# Patient Record
Sex: FEMALE | Race: WHITE | Employment: UNEMPLOYED | URBAN - NONMETROPOLITAN AREA
[De-identification: names, ages, dates, MRNs, and addresses within clinical notes are randomized per-mention and may not be internally consistent; named-entity substitution may affect disease eponyms.]

---

## 2018-05-11 ENCOUNTER — HOSPITAL ENCOUNTER (OUTPATIENT)
Dept: NON INVASIVE DIAGNOSTICS | Age: 58
Discharge: HOME | End: 2018-05-11
Payer: COMMERCIAL

## 2018-05-11 LAB
LV EF: 60 %
LVEF MODALITY: NORMAL

## 2018-05-11 PROCEDURE — 93306 TTE W/DOPPLER COMPLETE: CPT

## 2018-05-21 ENCOUNTER — HOSPITAL ENCOUNTER (OUTPATIENT)
Dept: MAMMOGRAPHY | Age: 58
Discharge: HOME | End: 2018-05-21
Payer: COMMERCIAL

## 2018-05-21 DIAGNOSIS — Z12.39 BREAST SCREENING: ICD-10-CM

## 2018-05-21 PROCEDURE — 77063 BREAST TOMOSYNTHESIS BI: CPT

## 2019-06-18 ENCOUNTER — HOSPITAL ENCOUNTER (OUTPATIENT)
Dept: MAMMOGRAPHY | Age: 59
Discharge: HOME | End: 2019-06-18
Payer: COMMERCIAL

## 2019-06-18 DIAGNOSIS — Z12.39 SCREENING BREAST EXAMINATION: ICD-10-CM

## 2019-06-18 PROCEDURE — 77063 BREAST TOMOSYNTHESIS BI: CPT

## 2019-06-18 PROCEDURE — 77067 SCR MAMMO BI INCL CAD: CPT

## 2020-07-16 ENCOUNTER — HOSPITAL ENCOUNTER (OUTPATIENT)
Dept: MAMMOGRAPHY | Age: 60
Discharge: HOME | End: 2020-07-16
Payer: COMMERCIAL

## 2020-07-16 PROCEDURE — 77063 BREAST TOMOSYNTHESIS BI: CPT

## 2020-08-25 ENCOUNTER — TELEPHONE (OUTPATIENT)
Dept: CASE MANAGEMENT | Age: 60
End: 2020-08-25

## 2020-08-25 NOTE — TELEPHONE ENCOUNTER
Name: Clemente Harris  : 1960  MRN: [id number]    Oncology Navigation Follow-Up Note    Contact Type:  Telephone    Notes:  Called patient re: request for genetic evaluation, indicated on breast cancer risk assessment 2020 at [facility] during mammogram visit. Asking many questions related to cost/insurance coverage/need to do testing based on her family history. Teaching provided on NCCN guidelines and insurance information/cost. Verbalizes understanding but still unsure if wants to proceed. Would like call from genetic counselor to decide. Request forwarded to [facility]. Will follow and assist with obtaining referral if needed.     Electronically signed by Prabhu Peres RN on 2020 at 12:42 PM

## 2020-08-26 ENCOUNTER — TELEPHONE (OUTPATIENT)
Dept: ONCOLOGY | Age: 60
End: 2020-08-26

## 2020-08-26 NOTE — TELEPHONE ENCOUNTER
Notified by Kevon Lesch, oncology nurse navigator, that patient qualified for genetic evaluation but is requesting phone call to discuss further before making decision to proceed. Contacted patient and left voicemail encouraging her to call back directly at her earliest convenience to discuss her questions.

## 2020-09-17 ENCOUNTER — TELEPHONE (OUTPATIENT)
Dept: CASE MANAGEMENT | Age: 60
End: 2020-09-17

## 2020-09-17 NOTE — TELEPHONE ENCOUNTER
Name: Sushma Sandoval  : 1960  MRN: [id number]    Oncology Navigation Follow-Up Note    Contact Type:  Telephone    Notes: Phani Patricio re: outcome of patient's genetic request and wanting to speak with genetic counselor. States she did leave message 2020 with contact number and has not heard from patient. I called patient again today, states \"I have not had a chance to call her yet. \" Encouraged patient to do and to let me know her decision. Verbalizes understanding of my contact number and states she \"will let me know soon. \" I plan to assist with obtaining referral from PCP if decides to proceed with process.      Electronically signed by Batsheva Rodriguez RN on 2020 at 2:38 PM

## 2020-12-16 ENCOUNTER — HOSPITAL ENCOUNTER (EMERGENCY)
Age: 60
Discharge: HOME | End: 2020-12-17
Attending: FAMILY MEDICINE
Payer: COMMERCIAL

## 2020-12-16 ENCOUNTER — APPOINTMENT (OUTPATIENT)
Dept: GENERAL RADIOLOGY | Age: 60
End: 2020-12-16
Payer: COMMERCIAL

## 2020-12-16 LAB
ALBUMIN SERPL-MCNC: 3.8 GM/DL (ref 3.4–5)
ALP BLD-CCNC: 96 U/L (ref 46–116)
ALT SERPL-CCNC: 30 U/L (ref 14–63)
AMYLASE: 38 U/L (ref 25–115)
ANION GAP: 10 MEQ/L (ref 8–16)
AST SERPL-CCNC: 21 U/L (ref 15–37)
BASOPHILS # BLD: 0.6 % (ref 0–3)
BILIRUB SERPL-MCNC: 0.5 MG/DL (ref 0.2–1)
BILIRUBIN DIRECT: 0.12 MG/DL (ref 0–0.2)
BUN BLDV-MCNC: 17 MG/DL (ref 7–18)
CHLORIDE BLD-SCNC: 106 MEQ/L (ref 98–107)
CO2: 28 MEQ/L (ref 21–32)
CREAT SERPL-MCNC: 1 MG/DL (ref 0.6–1.3)
EKG ATRIAL RATE: 72 BPM
EKG P AXIS: 54 DEGREES
EKG P-R INTERVAL: 150 MS
EKG Q-T INTERVAL: 374 MS
EKG QRS DURATION: 88 MS
EKG QTC CALCULATION (BAZETT): 409 MS
EKG R AXIS: 34 DEGREES
EKG T AXIS: -37 DEGREES
EKG VENTRICULAR RATE: 72 BPM
EOSINOPHILS RELATIVE PERCENT: 3 % (ref 0–4)
GFR, ESTIMATED: 60 ML/MIN/1.73M2
GLUCOSE BLD-MCNC: 115 MG/DL (ref 74–106)
HCT VFR BLD CALC: 43.3 % (ref 37–47)
HEMOGLOBIN: 14.5 GM/DL (ref 12–16)
LIPASE: 101 U/L (ref 73–393)
LYMPHOCYTES # BLD: 33.5 % (ref 15–47)
MCH RBC QN AUTO: 30.1 PG (ref 27–31)
MCHC RBC AUTO-ENTMCNC: 33.5 GM/DL (ref 33–37)
MCV RBC AUTO: 89.9 FL (ref 81–99)
MONOCYTES: 8.2 % (ref 0–12)
PDW BLD-RTO: 12.2 % (ref 11.5–14.5)
PLATELET # BLD: 311 THOU/MM3 (ref 130–400)
PMV BLD AUTO: 6.5 FL (ref 7.4–10.4)
POC CALCIUM: 9.9 MG/DL (ref 8.5–10.1)
POTASSIUM SERPL-SCNC: 3.6 MEQ/L (ref 3.5–5.1)
RBC # BLD: 4.81 MILL/MM3 (ref 4.2–5.4)
SEGS: 54.7 % (ref 43–75)
SODIUM BLD-SCNC: 144 MEQ/L (ref 136–145)
TOTAL PROTEIN: 7.5 GM/DL (ref 6.4–8.2)
TROPONIN I: < 0.017 NG/ML (ref 0.02–0.06)
WBC # BLD: 8.3 THOU/MM3 (ref 4.8–10.8)

## 2020-12-16 PROCEDURE — 80053 COMPREHEN METABOLIC PANEL: CPT

## 2020-12-16 PROCEDURE — 85025 COMPLETE CBC W/AUTO DIFF WBC: CPT

## 2020-12-16 PROCEDURE — 99284 EMERGENCY DEPT VISIT MOD MDM: CPT

## 2020-12-16 PROCEDURE — 36415 COLL VENOUS BLD VENIPUNCTURE: CPT

## 2020-12-16 PROCEDURE — 2709999900 HC NON-CHARGEABLE SUPPLY

## 2020-12-16 PROCEDURE — 83690 ASSAY OF LIPASE: CPT

## 2020-12-16 PROCEDURE — 82150 ASSAY OF AMYLASE: CPT

## 2020-12-16 PROCEDURE — 84484 ASSAY OF TROPONIN QUANT: CPT

## 2020-12-16 PROCEDURE — 71046 X-RAY EXAM CHEST 2 VIEWS: CPT

## 2020-12-16 PROCEDURE — 82248 BILIRUBIN DIRECT: CPT

## 2020-12-16 PROCEDURE — 93005 ELECTROCARDIOGRAM TRACING: CPT | Performed by: FAMILY MEDICINE

## 2020-12-17 VITALS
HEIGHT: 64 IN | WEIGHT: 195 LBS | SYSTOLIC BLOOD PRESSURE: 118 MMHG | HEART RATE: 77 BPM | BODY MASS INDEX: 33.29 KG/M2 | TEMPERATURE: 97.4 F | RESPIRATION RATE: 13 BRPM | OXYGEN SATURATION: 96 % | DIASTOLIC BLOOD PRESSURE: 60 MMHG

## 2020-12-17 PROCEDURE — 93010 ELECTROCARDIOGRAM REPORT: CPT | Performed by: INTERNAL MEDICINE

## 2020-12-17 ASSESSMENT — ENCOUNTER SYMPTOMS
ABDOMINAL PAIN: 0
CHEST TIGHTNESS: 0
SINUS PRESSURE: 0
NAUSEA: 0
WHEEZING: 0
DIARRHEA: 0
COLOR CHANGE: 0
BACK PAIN: 0
VOMITING: 0
SHORTNESS OF BREATH: 0
SORE THROAT: 0

## 2020-12-17 NOTE — ED TRIAGE NOTES
Pt presents front window with spouse at side. C/o palpitations without cp that began around lunch time. Pt expressed she rested and it felt somewhat better but did not resolve. She \"listened to her heart\" and heard irregularities and wondered if she was in \"afib\". Pt sees Dr Stefani Benedict for cardiology> History of palpitations. Pt denies sob until walking to the room but is admittedly anxious as well. Pt triaged exam room 7. Alert and oriented. Skin warm and dry. Calm and cooperative. Spouse to room at bedside.

## 2020-12-17 NOTE — ED NOTES
Pt returned to ED from xray. Updated on wait times. Call light in reach.       Nydia Trivedi, RN  12/16/20 [id number]

## 2020-12-17 NOTE — ED PROVIDER NOTES
[facility]          CHIEF COMPLAINT       Chief Complaint   Patient presents with    Palpitations       Nurses Notes reviewed and I agree except as noted in the HPI. HISTORY OF PRESENT ILLNESS    Mary Cárdenas is a 63 y.o. female who presents for evaluation of palpitations. She does not have any chest pain but has noted palpitations throughout the day. She states that she has experienced palpitations in the past but they have been persistent since around lunchtime. She states that she listened to her heart and could hear the irregularities in addition to feel them when they were occurring. Patient follows with Dr. Arlene Mills. REVIEW OF SYSTEMS     Review of Systems   Constitutional: Negative for appetite change, chills, fatigue and fever. HENT: Negative for sinus pressure and sore throat. Respiratory: Negative for chest tightness, shortness of breath and wheezing. Cardiovascular: Positive for palpitations. Negative for chest pain and leg swelling. Gastrointestinal: Negative for abdominal pain, diarrhea, nausea and vomiting. Genitourinary: Negative for dysuria, flank pain and frequency. Musculoskeletal: Negative for back pain, gait problem, joint swelling and neck stiffness. Skin: Negative for color change and rash. Neurological: Negative for dizziness, light-headedness and headaches. Psychiatric/Behavioral: Negative for agitation and hallucinations. The patient is nervous/anxious. PAST MEDICAL HISTORY    has a past medical history of GERD (gastroesophageal reflux disease) and Hyperlipidemia. SURGICAL HISTORY      has no past surgical history on file.     CURRENT MEDICATIONS       Discharge Medication List as of 12/16/2020 11:50 PM      CONTINUE these medications which have NOT CHANGED    Details nitroGLYCERIN (NITROSTAT) 0.4 MG SL tablet Place 1 tablet under the tongue every 5 minutes as needed for Chest pain, Disp-25 tablet, R-1      Fish Oil-Cholecalciferol (FISH OIL + D3 PO) Take  by mouth. Glucosamine-Chondroit-Vit C-Mn (GLUCOSAMINE CHONDR 500 COMPLEX PO) Take  by mouth. CINNAMON PO Take  by mouth. therapeutic multivitamin-minerals (THERAGRAN-M) tablet Take 1 tablet by mouth daily. metoprolol (TOPROL-XL) 25 MG XL tablet Take 0.5 tablets by mouth daily. , Disp-45 tablet, R-3      ropinirole (REQUIP) 0.5 MG tablet Take 1 mg by mouth 3 times daily. simvastatin (ZOCOR) 20 MG tablet Take 20 mg by mouth nightly. loratadine (CLARITIN) 10 MG tablet Take 10 mg by mouth daily. aspirin 81 MG EC tablet Take 81 mg by mouth daily. omeprazole (PRILOSEC) 20 MG capsule Take 20 mg by mouth daily. ALLERGIES     is allergic to avalide [irbesartan-hydrochlorothiazide] and gabapentin. FAMILY HISTORY     She indicated that her mother is alive. She indicated that her father is alive. She indicated that her brother is alive. She indicated that her maternal grandfather is . She indicated that her paternal grandfather is . family history includes Cancer in her father; Diabetes in her brother and father; Heart Disease in her father, maternal grandfather, and paternal grandfather; High Blood Pressure in her father; Stroke in her maternal grandfather and paternal grandfather. SOCIAL HISTORY      reports that she has never smoked. She does not have any smokeless tobacco history on file. She reports that she does not drink alcohol or use drugs. PHYSICAL EXAM     INITIAL VITALS:  height is 5' 4\" (1.626 m) and weight is 195 lb (88.5 kg). Her temporal temperature is 97.4 °F (36.3 °C). Her blood pressure is 118/60 and her pulse is 77. Her respiration is 13 and oxygen saturation is 96%. Physical Exam  Vitals signs and nursing note reviewed. All other components within normal limits   TROPONIN   HEPATIC FUNCTION PANEL   LIPASE   AMYLASE   ANION GAP       DEPARTMENT COURSE:   Vitals:    Vitals:    12/16/20 2318 12/16/20 2332 12/16/20 2346 12/17/20 0002   BP: 132/80 (!) 143/86 116/68 118/60   Pulse: 70 79 72 77   Resp: 14 12 14 13   Temp:       TempSrc:       SpO2: 97% 96% 94% 96%   Weight:       Height:           MDM:  Patient presents for evaluation of her palpitations. EKG demonstrates PACs. Electrolytes were within normal limits. Patient is not anemic and troponin not detected. Patient was discharged home in stable condition with recommendation to follow-up with Dr. Zeus Amaya return to the department for symptom worsening or for evaluation of new concerning symptoms. CRITICAL CARE:   None    CONSULTS:  None    PROCEDURES:  None    FINAL IMPRESSION      1.  Palpitations          DISPOSITION/PLAN   Discharge    PATIENT REFERRED TO:  Olman Howell, [facility]  [address]  [phone]    Schedule an appointment as soon as possible for a visit in 2 days  As needed      DISCHARGEMEDICATIONS:  Discharge Medication List as of 12/16/2020 11:50 PM          (Please note that portions of this note were completedwith a voice recognition program.  Efforts were made to edit the dictations but occasionally words are mis-transcribed.)    MD Jose Brooks MD  12/17/20 7366

## 2020-12-17 NOTE — ED NOTES
Discharge teaching and instructions for condition explained to patient. AVS reviewed given to patient. Patient voiced understanding regarding prescriptions, follow up appointments and care of self at home.  Pt discharged to home in stable condition with spouse       Phi Caldwell RN  12/17/20 0010       Phi Caldwell RN  12/17/20 0010

## 2021-07-20 ENCOUNTER — OFFICE VISIT (OUTPATIENT)
Dept: SURGERY | Age: 61
End: 2021-07-20
Payer: COMMERCIAL

## 2021-07-20 VITALS
HEART RATE: 76 BPM | RESPIRATION RATE: 14 BRPM | HEIGHT: 64 IN | OXYGEN SATURATION: 95 % | SYSTOLIC BLOOD PRESSURE: 132 MMHG | WEIGHT: 195 LBS | TEMPERATURE: 97.6 F | BODY MASS INDEX: 33.29 KG/M2 | DIASTOLIC BLOOD PRESSURE: 72 MMHG

## 2021-07-20 DIAGNOSIS — Z12.11 ENCOUNTER FOR COLORECTAL CANCER SCREENING: Primary | ICD-10-CM

## 2021-07-20 DIAGNOSIS — K21.9 GASTROESOPHAGEAL REFLUX DISEASE, UNSPECIFIED WHETHER ESOPHAGITIS PRESENT: ICD-10-CM

## 2021-07-20 DIAGNOSIS — Z12.12 ENCOUNTER FOR COLORECTAL CANCER SCREENING: Primary | ICD-10-CM

## 2021-07-20 PROCEDURE — 99213 OFFICE O/P EST LOW 20 MIN: CPT | Performed by: SURGERY

## 2021-07-20 RX ORDER — MULTIVIT WITH MINERALS/LUTEIN
250 TABLET ORAL DAILY
COMMUNITY

## 2021-07-20 RX ORDER — DOCUSATE SODIUM 100 MG/1
100 CAPSULE, LIQUID FILLED ORAL 2 TIMES DAILY
COMMUNITY

## 2021-07-20 RX ORDER — MULTIVIT-MIN/IRON/FOLIC ACID/K 18-600-40
CAPSULE ORAL
COMMUNITY

## 2021-07-20 RX ORDER — LANOLIN ALCOHOL/MO/W.PET/CERES
5 CREAM (GRAM) TOPICAL DAILY
COMMUNITY

## 2021-07-20 ASSESSMENT — ENCOUNTER SYMPTOMS
COUGH: 0
SINUS PRESSURE: 0
CHEST TIGHTNESS: 0
CONSTIPATION: 0
APNEA: 0
NAUSEA: 0
PHOTOPHOBIA: 0
EYE REDNESS: 0
COLOR CHANGE: 0
SORE THROAT: 0
ABDOMINAL PAIN: 0
TROUBLE SWALLOWING: 0
DIARRHEA: 0
RECTAL PAIN: 0
RHINORRHEA: 0
SINUS PAIN: 0
VOMITING: 0
VOICE CHANGE: 0
EYE PAIN: 0
WHEEZING: 0
ABDOMINAL DISTENTION: 0
BACK PAIN: 0
EYE ITCHING: 0
BLOOD IN STOOL: 0
STRIDOR: 0
EYE DISCHARGE: 0
ANAL BLEEDING: 0
FACIAL SWELLING: 0
CHOKING: 0
SHORTNESS OF BREATH: 0

## 2021-07-20 NOTE — LETTER
Chiara Castro   [address] [place] [address]  [phone]     Pt Name: Josey Azevedo  Medical Record Number: [id number]  Date of Birth 1960   Today's Date: 7/20/2021    Jarocho Bueno was seen in consultation in the office today. My assessment and plans are listed below. ASSESSMENT     Diagnosis Orders   1. Encounter for colorectal cancer screening  COLONOSCOPY W/ OR W/O BIOPSY   2. Gastroesophageal reflux disease, unspecified whether esophagitis present         Past Medical History        Past Medical History:   Diagnosis Date    GERD (gastroesophageal reflux disease)      Hyperlipidemia            PLANS   1. Schedule Alexus for colonoscopy with possible biopsy/polypectomy and EGD  2. Potential diagnostic/therapeutic options and alternatives were discussed with the patient in the office. Potential risks of bleeding, infection, perforation and missed lesions was reviewed. Potential for biopsy and/or polypectomy was discussed. We also discussed the use of IV sedation and colon preparation instructions. The patient was given an opportunity to ask questions. Once answered, the patient was agreeable to proceed with the procedure. 3. Status: Outpatient in endoscopy  4. Planned anesthesia: IV sedation provided by myself  5. Perioperative bowel preparation with Miralax and Dulcolax. Instructions given and questions answered. If I can provide any additional assistance or you have any concerns, please feel free to contact me. Thank you for allowing to participate in the care of your patients. Sincerely,      Janny Encinas.  Amandeep Arthur

## 2021-07-20 NOTE — PROGRESS NOTES
Subjective:      Patient ID: Donn Ruiz is a 57 y.o. female. Chief Complaint   Patient presents with   [facility] Consult     established pt--due for colonoscopy screening-last one 2011       HPI  /72 (Site: Right Upper Arm, Position: Sitting, Cuff Size: Medium Adult)   Pulse 76   Temp 97.6 °F (36.4 °C) (Tympanic)   Resp 14   Ht 5' 4\" (1.626 m)   Wt 195 lb (88.5 kg)   SpO2 95%   BMI 33.47 kg/m²     Review of Systems   Constitutional: Negative for activity change, appetite change, chills, diaphoresis, fatigue, fever and unexpected weight change. HENT: Negative for congestion, dental problem, drooling, ear discharge, ear pain, facial swelling, hearing loss, mouth sores, nosebleeds, postnasal drip, rhinorrhea, sinus pressure, sinus pain, sneezing, sore throat, tinnitus, trouble swallowing and voice change. Eyes: Negative for photophobia, pain, discharge, redness, itching and visual disturbance. Respiratory: Negative for apnea, cough, choking, chest tightness, shortness of breath, wheezing and stridor. Cardiovascular: Negative for chest pain, palpitations and leg swelling. Gastrointestinal: Negative for abdominal distention, abdominal pain, anal bleeding, blood in stool, constipation, diarrhea, nausea, rectal pain and vomiting. Endocrine: Negative for cold intolerance, heat intolerance, polydipsia, polyphagia and polyuria. Genitourinary: Negative for decreased urine volume, difficulty urinating, dyspareunia, dysuria, enuresis, flank pain, frequency, genital sores, hematuria, menstrual problem, pelvic pain, urgency, vaginal bleeding, vaginal discharge and vaginal pain. Musculoskeletal: Negative for arthralgias, back pain, gait problem, joint swelling, myalgias, neck pain and neck stiffness. Skin: Negative for color change, pallor, rash and wound. Allergic/Immunologic: Negative for environmental allergies, food allergies and immunocompromised state.    Neurological: Negative for dizziness, tremors, seizures, syncope, facial asymmetry, speech difficulty, weakness, light-headedness, numbness and headaches. Hematological: Negative for adenopathy. Does not bruise/bleed easily. Psychiatric/Behavioral: Negative for agitation, behavioral problems, confusion, decreased concentration, dysphoric mood, hallucinations, self-injury, sleep disturbance and suicidal ideas. The patient is not nervous/anxious and is not hyperactive.         Objective:   Physical Exam    Assessment:            Plan:              Omsany Apodaca

## 2021-07-20 NOTE — PROGRESS NOTES
[facility] [address]  [phone]  New Patient Evaluation in Office    Pt Name: Bhavna Madrigal  Date of Birth 1960   Today's Date: 7/20/2021  Medical Record Number: [id number]  Referring Provider: No ref. provider found  Primary Care Provider: Gaviota Esquivel MD  Chief Complaint   Patient presents with   [facility] Consult     established pt--due for colonoscopy screening-last one 2011     ASSESSMENT       Diagnosis Orders   1. Encounter for colorectal cancer screening  COLONOSCOPY W/ OR W/O BIOPSY   2. Gastroesophageal reflux disease, unspecified whether esophagitis present       Past Medical History:   Diagnosis Date    GERD (gastroesophageal reflux disease)     Hyperlipidemia           PLANS      1. Schedule Alexus for colonoscopy with possible biopsy/polypectomy and EGD  2. Potential diagnostic/therapeutic options and alternatives were discussed with the patient in the office. Potential risks of bleeding, infection, perforation and missed lesions was reviewed. Potential for biopsy and/or polypectomy was discussed. We also discussed the use of IV sedation and colon preparation instructions. The patient was given an opportunity to ask questions. Once answered, the patient was agreeable to proceed with the procedure. 3. Status: Outpatient in endoscopy  4. Planned anesthesia: IV sedation provided by myself  5. Perioperative bowel preparation with Miralax and Dulcolax. Instructions given and questions answered. Michael Awad is a 57 y.o. female seen in the consultation for evaluation regarding colonoscopy. She is over the age of 47 and last colonoscopy completed 2011 without polyps.  She denies any history of previous adenomatous polyp, previous colon cancer, family history of colon cancer, rectal bleeding, melena, iron deficiency anemia, chronic abdominal pain, change in bowel habits, unexplained weight loss. She does report worsening reflux symptoms, which will wake her up at night. This is worse with spicy foods and tomato sauces, and worse when eating late at night. She takes omeprazole daily, but this does not always keep symptoms controlled. She has not tried additional antacids. Last EGD done 10 years ago without evidence of Kwon's esophagus    Past Medical History  Past Medical History:   Diagnosis Date    GERD (gastroesophageal reflux disease)     Hyperlipidemia      Past Surgical History  Past Surgical History:   Procedure Laterality Date    COLONOSCOPY  12/05/2011    Dr. Emily Euceda  12/05/2011    Dr. Mague Rojas EXTRACTION       Medications  Current Outpatient Medications   Medication Sig Dispense Refill    melatonin 3 MG TABS tablet Take 5 mg by mouth daily      docusate sodium (COLACE) 100 MG capsule Take 100 mg by mouth 2 times daily      Ascorbic Acid (VITAMIN C) 250 MG tablet Take 250 mg by mouth daily      Cholecalciferol (VITAMIN D) 50 MCG (2000 UT) CAPS capsule Take by mouth      Zinc Sulfate (ZINC-220 PO) Take by mouth      Fish Oil-Cholecalciferol (FISH OIL + D3 PO) Take  by mouth.  Glucosamine-Chondroit-Vit C-Mn (GLUCOSAMINE CHONDR 500 COMPLEX PO) Take  by mouth.  CINNAMON PO Take  by mouth.  therapeutic multivitamin-minerals (THERAGRAN-M) tablet Take 1 tablet by mouth daily.  metoprolol (TOPROL-XL) 25 MG XL tablet Take 0.5 tablets by mouth daily. 45 tablet 3    ropinirole (REQUIP) 0.5 MG tablet Take 1 mg by mouth 3 times daily.  simvastatin (ZOCOR) 20 MG tablet Take 20 mg by mouth nightly.  aspirin 81 MG EC tablet Take 81 mg by mouth daily.  omeprazole (PRILOSEC) 20 MG capsule Take 20 mg by mouth daily.         nitroGLYCERIN (NITROSTAT) 0.4 MG SL tablet Place 1 tablet under the tongue every 5 minutes as needed for Chest pain (Patient not taking: Reported on 7/20/2021) 25 tablet 1    loratadine (CLARITIN) 10 MG tablet Take 10 mg by mouth daily. (Patient not taking: Reported on 7/20/2021)       No current facility-administered medications for this visit. Allergies  is allergic to avalide [irbesartan-hydrochlorothiazide] and gabapentin. Family History  family history includes Cancer in her father; Diabetes in her brother and father; Heart Disease in her father, maternal grandfather, and paternal grandfather; High Blood Pressure in her father; Stroke in her maternal grandfather and paternal grandfather. Social History   reports that she has never smoked. She has never used smokeless tobacco. She reports that she does not drink alcohol and does not use drugs. Health Screening Exams  Health Maintenance   Topic Date Due    Hepatitis C screen  Never done    COVID-19 Vaccine (1) Never done    HIV screen  Never done    DTaP/Tdap/Td vaccine (1 - Tdap) Never done    Cervical cancer screen  Never done    Diabetes screen  Never done    Colon cancer screen colonoscopy  Never done    Shingles Vaccine (1 of 2) Never done    Lipid screen  07/27/2017    Flu vaccine (1) 09/01/2021    Breast cancer screen  07/16/2022    Hepatitis A vaccine  Aged Out    Hepatitis B vaccine  Aged Out    Hib vaccine  Aged Out    Meningococcal (ACWY) vaccine  Aged Out    Pneumococcal 0-64 years Vaccine  Aged Out     Review of Systems  History obtained from the patient. Constitutional: Denies any fever or chills. Resp: Denies any shortness of breath of cough. CV: Denies any chest pain or palpitations. GI: Positive heartburn and reflux. Denies any nausea, vomiting, diarrhea, constipation, abdominal pain or blood in the stool. OBJECTIVE    VITALS:  height is 5' 4\" (1.626 m) and weight is 195 lb (88.5 kg). Her tympanic temperature is 97.6 °F (36.4 °C). Her blood pressure is 132/72 and her pulse is 76. Her respiration is 14 and oxygen saturation is 95%. Body mass index is 33.47 kg/m².   CONSTITUTIONAL: Alert and oriented times 3, no acute distress and cooperative to examination with proper mood and affect. SKIN: Skin color, texture, turgor normal. No rashes or lesions. LYMPH: no cervical nodes, no inguinal nodes  HEENT: Head is normocephalic, atraumatic. EOMI, PERRLA. NECK: Supple, symmetrical, trachea midline, no adenopathy, thyroid symmetric, not enlarged and no tenderness, skin normal.  CHEST/LUNGS: chest symmetric with normal A/P diameter, normal respiratory rate and rhythm, lungs clear to auscultation without wheezes, rales or rhonchi. No accessory muscle use. Scars None   CARDIOVASCULAR: Heart sounds are normal.  Regular rate and rhythm without murmur, gallop or rub. Normal S1 and S2. Carotid and femoral pulses 2+/4 and equal bilaterally. ABDOMEN: Normal shape. No scar(s) present. Normal bowel sounds. No bruits. soft, nontender, nondistended, no masses or organomegaly. no evidence of hernia. Percussion: Normal without hepatosplenomegally. RECTAL: deferred, not clinically indicated  NEUROLOGIC: There are no focalizing motor or sensory deficits. CN II-XII are grossly intact. Benedetta Ou EXTREMITIES: no cyanosis, no clubbing and no edema. Thank you for the interesting evaluation. Further recommendations as listed above.        Electronically signed by Colette Rinne, MD on 7/20/2021 at 1:55 PM

## 2021-08-09 ENCOUNTER — HOSPITAL ENCOUNTER (OUTPATIENT)
Age: 61
Setting detail: OUTPATIENT SURGERY
Discharge: HOME | End: 2021-08-09
Attending: SURGERY | Admitting: SURGERY
Payer: COMMERCIAL

## 2021-08-09 VITALS
TEMPERATURE: 97.6 F | WEIGHT: 199.4 LBS | RESPIRATION RATE: 18 BRPM | DIASTOLIC BLOOD PRESSURE: 61 MMHG | BODY MASS INDEX: 34.04 KG/M2 | OXYGEN SATURATION: 95 % | HEIGHT: 64 IN | HEART RATE: 63 BPM | SYSTOLIC BLOOD PRESSURE: 110 MMHG

## 2021-08-09 PROCEDURE — 2580000003 HC RX 258: Performed by: SURGERY

## 2021-08-09 PROCEDURE — 7100000011 HC PHASE II RECOVERY - ADDTL 15 MIN: Performed by: SURGERY

## 2021-08-09 PROCEDURE — 88305 TISSUE EXAM BY PATHOLOGIST: CPT

## 2021-08-09 PROCEDURE — 45380 COLONOSCOPY AND BIOPSY: CPT | Performed by: SURGERY

## 2021-08-09 PROCEDURE — 3609010300 HC COLONOSCOPY W/BIOPSY SINGLE/MULTIPLE: Performed by: SURGERY

## 2021-08-09 PROCEDURE — 7100000010 HC PHASE II RECOVERY - FIRST 15 MIN: Performed by: SURGERY

## 2021-08-09 PROCEDURE — 6360000002 HC RX W HCPCS: Performed by: SURGERY

## 2021-08-09 PROCEDURE — 2709999900 HC NON-CHARGEABLE SUPPLY: Performed by: SURGERY

## 2021-08-09 PROCEDURE — 2720000010 HC SURG SUPPLY STERILE: Performed by: SURGERY

## 2021-08-09 RX ORDER — SODIUM CHLORIDE 450 MG/100ML
INJECTION, SOLUTION INTRAVENOUS CONTINUOUS
Status: DISCONTINUED | OUTPATIENT
Start: 2021-08-09 | End: 2021-08-09 | Stop reason: HOSPADM

## 2021-08-09 RX ORDER — SODIUM CHLORIDE 9 MG/ML
25 INJECTION, SOLUTION INTRAVENOUS PRN
Status: DISCONTINUED | OUTPATIENT
Start: 2021-08-09 | End: 2021-08-09 | Stop reason: HOSPADM

## 2021-08-09 RX ORDER — FENTANYL CITRATE 50 UG/ML
INJECTION, SOLUTION INTRAMUSCULAR; INTRAVENOUS PRN
Status: DISCONTINUED | OUTPATIENT
Start: 2021-08-09 | End: 2021-08-09 | Stop reason: ALTCHOICE

## 2021-08-09 RX ORDER — SODIUM CHLORIDE 0.9 % (FLUSH) 0.9 %
5-40 SYRINGE (ML) INJECTION PRN
Status: DISCONTINUED | OUTPATIENT
Start: 2021-08-09 | End: 2021-08-09 | Stop reason: HOSPADM

## 2021-08-09 RX ORDER — MIDAZOLAM HYDROCHLORIDE 1 MG/ML
INJECTION INTRAMUSCULAR; INTRAVENOUS PRN
Status: DISCONTINUED | OUTPATIENT
Start: 2021-08-09 | End: 2021-08-09 | Stop reason: ALTCHOICE

## 2021-08-09 RX ORDER — SODIUM CHLORIDE 0.9 % (FLUSH) 0.9 %
5-40 SYRINGE (ML) INJECTION EVERY 12 HOURS SCHEDULED
Status: DISCONTINUED | OUTPATIENT
Start: 2021-08-09 | End: 2021-08-09 | Stop reason: HOSPADM

## 2021-08-09 RX ADMIN — SODIUM CHLORIDE: 4.5 INJECTION, SOLUTION INTRAVENOUS at 07:40

## 2021-08-09 ASSESSMENT — PAIN SCALES - GENERAL
PAINLEVEL_OUTOF10: 0
PAINLEVEL_OUTOF10: 0

## 2021-08-09 ASSESSMENT — PAIN - FUNCTIONAL ASSESSMENT: PAIN_FUNCTIONAL_ASSESSMENT: 0-10

## 2021-08-09 NOTE — OP NOTE
Moon [place] 59  RECORD OF OPERATION  PATIENT NAME: Adama Gamble  MEDICAL RECORD NO. [id number]  SURGEON: Brendon Rogel. KAYLIN Arthur  PRIMARY CARE PHYSICIAN: Gregory Awad MD     PROCEDURE PERFORMED:  8/9/2021   PREOPERATIVE DIAGNOSIS:  COLON CA SCREENING, GERD  POSTOPERATIVE DIAGNOSIS:  Small hyperplastic polyp transverse colon  PROCEDURE PERFORMED:  Colonoscopy polypectomy (cold biopsy)  ANESTHESIA:  IV sedation with 75 mcg Fentanyl and 5 mg Versed  ESTIMATED BLOOD LOSS:  None. SPECIMENS: colon polyp sent to pathology for analysis  COMPLICATIONS:  None immediately appreciated. DISCUSSION:  Sandy Rodriguez is a 57y.o.-year-old female who presented to my office and seen in evaluation at request of Gregory Awad MD. After history and physical examination was performed, potential diagnostic and therapeutic modalities discussed with the patient. Radiographic and endoscopic studies were discussed, risks, complications, benefits reviewed. She was given opportunity to ask questions, once answeredinformed consent was obtained. The patient was the Endoscopy Suite on 8/9/2021 for procedure. OPERATIVE FINDINGS:  At the time of endoscopy good prep appreciated. Scope was able to be advanced to level of cecum. There was a small hyperplastic polyp of the transverse colon removed by cold biopsy, otherwise unremarkable. PROCEDURE:  The patient was brought to endoscopy suite, placed in left lateral Salazar position, placed under continuous cardiac telemetry, blood pressure, pulse oximetry monitoring, placed under IV sedation with medications noted above, done in incremental fashion to achieve sedation. Digital rectal exam performed revealing adequate rectal tone, no masses palpable. Colonoscope advanced to rectum, rectum gently insufflated. Under direct visualization colonoscope was advanced entirety of colon to level of cecum, confirmed by ileocecal valve, triangle folds, appendiceal orifice.  The scope was slowly retracted, intraluminal structures inspected. The scope was retracted through the cecum, ascending colon, transverse colon, descending colon, sigmoid colon and rectum which were without pathology with the exception of a small hyperplastic polyp of the transverse colon removed by cold biopsy. The colonoscope was then removed after the colon was decompressed. The patient tolerated procedure well without immediate complication evident. Postoperative findings were discussed with the patient's family. She was given discharge instructions and will follow up colonoscopy in 7-10 years for reevaluation.       Electronically signed by Quynh Jeffers DO on 8/9/2021 at 8:32 AM

## 2021-08-09 NOTE — H&P
DO CHARO Padilla DR GENERAL SURGERY  PRE SEDATION HISTORY AND PHYSICAL      Pt Name: Columbus Harada  MRN: [id number]  YOB: 1960  Provider Performing Procedure: DO KAYLIN Padilla  Primary Care Physician: Hollis Brizuela MD  PRE-PROCEDURE   DNR-CCA/DNR-CC - No  Brief History/Pre-Procedure Diagnosis: COLON CA SCREENING, GERD    MEDICAL HISTORY       has a past medical history of Arthritis, GERD (gastroesophageal reflux disease), and Hyperlipidemia. SURGICAL HISTORY   has a past surgical history that includes Upper gastrointestinal endoscopy (12/05/2011); Colonoscopy (12/05/2011); and [place] tooth extraction. ALLERGIES   Allergies as of 07/20/2021 - Fully Reviewed 07/20/2021   Allergen Reaction Noted    Avalide [irbesartan-hydrochlorothiazide]  04/23/2012    Gabapentin  04/23/2012     MEDICATIONS   Coumadin Use Last 7 Days: No  Antiplatelet drug therapy use last 7 days: No  Other anticoagulant use last 7 days: No  Prior to Admission medications    Medication Sig Start Date End Date Taking? Authorizing Provider   melatonin 3 MG TABS tablet Take 5 mg by mouth daily   Yes Historical Provider, MD   docusate sodium (COLACE) 100 MG capsule Take 100 mg by mouth 2 times daily   Yes Historical Provider, MD   Ascorbic Acid (VITAMIN C) 250 MG tablet Take 250 mg by mouth daily   Yes Historical Provider, MD   Cholecalciferol (VITAMIN D) 50 MCG (2000 UT) CAPS capsule Take by mouth   Yes Historical Provider, MD   Zinc Sulfate (ZINC-220 PO) Take by mouth   Yes Historical Provider, MD   Fish Oil-Cholecalciferol (FISH OIL + D3 PO) Take  by mouth. Yes Historical Provider, MD   Glucosamine-Chondroit-Vit C-Mn (GLUCOSAMINE CHONDR 500 COMPLEX PO) Take  by mouth. Yes Historical Provider, MD   CINNAMON PO Take  by mouth. Yes Historical Provider, MD   therapeutic multivitamin-minerals (THERAGRAN-M) tablet Take 1 tablet by mouth daily.    Yes Historical Provider, MD   metoprolol (TOPROL-XL) 25 MG XL tablet Take 0.5 tablets by mouth daily. 3/15/13  Yes Jemal Sampson MD   ropinirole (REQUIP) 0.5 MG tablet Take 1 mg by mouth 3 times daily. Yes Historical Provider, MD   simvastatin (ZOCOR) 20 MG tablet Take 20 mg by mouth nightly. Yes Historical Provider, MD   aspirin 81 MG EC tablet Take 81 mg by mouth daily. Yes Historical Provider, MD   omeprazole (PRILOSEC) 20 MG capsule Take 20 mg by mouth daily. Yes Historical Provider, MD   nitroGLYCERIN (NITROSTAT) 0.4 MG SL tablet Place 1 tablet under the tongue every 5 minutes as needed for Chest pain  Patient not taking: Reported on 7/20/2021 7/28/16   Jemal Sampson MD     PHYSICAL EXAMINATION   Vitals:  height is 5' 4\" (1.626 m) and weight is 199 lb 6.4 oz (90.4 kg). Her temporal temperature is 96.3 °F (35.7 °C). Her blood pressure is 132/81 and her pulse is 67. Her respiration is 16 and oxygen saturation is 96%. Mental Status: alert, cooperative  Heart:   Heart regular rate and rhythm     Lungs:  Clear      Abdomen:  Soft, nontender       PLANNED PROCEDURE   Colonoscopy EGD   SEDATION   Planned agent for conscious sedation: Fentanyl, Versed  ASA Classification: 2  Class 1: A normal healthy patient  Class 2: Pt with mild to moderate systemic disease  Class 3: Severe systemic disease or disturbance  Class 4: Severe systemic disorders that are already life threatening. Class 5: Moribund pt with little chances of survival, for more than 24 hours. Mallampati I Airway Classification: 2    1. Pre-procedure diagnostic studies complete and results available. Comment:    2. Previous sedation/anesthesia experiences assessed. Comment:    3. The patient is an appropriate candidate to undergo the planned procedure sedation and anesthesia. (Refer to nursing sedation/analgesia documentation record)  4. Formulation and discussion of sedation/procedure plan, risks, and expectations with patient and/or responsible adult completed.   5. Patient examined immediately prior to the procedure.  (Refer to nursing sedation/analgesia documentation record)      Electronically signed by Jose Antonio Gallegos DO on 8/9/2021 at 8:04 AM

## 2021-08-09 NOTE — PROGRESS NOTES
Recovery mode, denies discomfort. Passing gas, taking fluids. Dr. Nico Fountain discussed findings with pt and . Discharge instructions provided and understanding verbalized.

## 2021-08-13 DIAGNOSIS — K21.9 GASTROESOPHAGEAL REFLUX DISEASE, UNSPECIFIED WHETHER ESOPHAGITIS PRESENT: ICD-10-CM

## 2021-08-13 DIAGNOSIS — Z12.11 ENCOUNTER FOR COLORECTAL CANCER SCREENING: ICD-10-CM

## 2021-08-13 DIAGNOSIS — Z12.12 ENCOUNTER FOR COLORECTAL CANCER SCREENING: ICD-10-CM

## 2021-09-15 ENCOUNTER — HOSPITAL ENCOUNTER (OUTPATIENT)
Dept: MAMMOGRAPHY | Age: 61
Discharge: HOME | End: 2021-09-15
Payer: COMMERCIAL

## 2021-09-15 DIAGNOSIS — Z12.39 SCREENING BREAST EXAMINATION: ICD-10-CM

## 2021-09-15 PROCEDURE — 77063 BREAST TOMOSYNTHESIS BI: CPT

## 2021-09-17 ENCOUNTER — HOSPITAL ENCOUNTER (OUTPATIENT)
Dept: CT IMAGING | Age: 61
Discharge: HOME | End: 2021-09-17
Payer: COMMERCIAL

## 2021-09-17 DIAGNOSIS — S82.124A CLOSED NONDISPLACED FRACTURE OF LATERAL CONDYLE OF RIGHT TIBIA, INITIAL ENCOUNTER: ICD-10-CM

## 2021-09-17 PROCEDURE — 73700 CT LOWER EXTREMITY W/O DYE: CPT

## 2021-10-05 ENCOUNTER — HOSPITAL ENCOUNTER (OUTPATIENT)
Age: 61
Discharge: HOME | End: 2021-10-05
Payer: COMMERCIAL

## 2021-10-05 ENCOUNTER — HOSPITAL ENCOUNTER (OUTPATIENT)
Dept: GENERAL RADIOLOGY | Age: 61
Discharge: HOME | End: 2021-10-05
Payer: COMMERCIAL

## 2021-10-05 DIAGNOSIS — T14.8XXA FRACTURE: ICD-10-CM

## 2021-10-05 PROCEDURE — 73564 X-RAY EXAM KNEE 4 OR MORE: CPT

## 2021-11-09 ENCOUNTER — HOSPITAL ENCOUNTER (OUTPATIENT)
Dept: GENERAL RADIOLOGY | Age: 61
Discharge: HOME | End: 2021-11-09
Payer: COMMERCIAL

## 2021-11-09 ENCOUNTER — HOSPITAL ENCOUNTER (OUTPATIENT)
Age: 61
Discharge: HOME | End: 2021-11-09
Payer: COMMERCIAL

## 2021-11-09 DIAGNOSIS — S82.121D CLOSED FRACTURE OF LATERAL PORTION OF RIGHT TIBIAL PLATEAU WITH ROUTINE HEALING: ICD-10-CM

## 2021-11-09 PROCEDURE — 73564 X-RAY EXAM KNEE 4 OR MORE: CPT

## 2021-11-22 ENCOUNTER — HOSPITAL ENCOUNTER (OUTPATIENT)
Age: 61
Discharge: HOME | End: 2021-11-22
Payer: COMMERCIAL

## 2021-11-22 LAB
BUN BLDV-MCNC: 15 MG/DL (ref 7–22)
CALCIUM SERPL-MCNC: 10 MG/DL (ref 8.5–10.5)
CREAT SERPL-MCNC: 0.7 MG/DL (ref 0.4–1.2)
GFR SERPL CREATININE-BSD FRML MDRD: 85 ML/MIN/1.73M2
PHOSPHORUS: 3.2 MG/DL (ref 2.4–4.7)

## 2021-11-22 PROCEDURE — 82310 ASSAY OF CALCIUM: CPT

## 2021-11-22 PROCEDURE — 82306 VITAMIN D 25 HYDROXY: CPT

## 2021-11-22 PROCEDURE — 84520 ASSAY OF UREA NITROGEN: CPT

## 2021-11-22 PROCEDURE — 84100 ASSAY OF PHOSPHORUS: CPT

## 2021-11-22 PROCEDURE — 36415 COLL VENOUS BLD VENIPUNCTURE: CPT

## 2021-11-22 PROCEDURE — 82565 ASSAY OF CREATININE: CPT

## 2021-11-23 LAB — VITAMIN D 25-HYDROXY: 84 NG/ML (ref 30–100)

## 2021-12-21 ENCOUNTER — HOSPITAL ENCOUNTER (OUTPATIENT)
Age: 61
Discharge: HOME | End: 2021-12-21
Payer: COMMERCIAL

## 2021-12-21 ENCOUNTER — HOSPITAL ENCOUNTER (OUTPATIENT)
Dept: GENERAL RADIOLOGY | Age: 61
Discharge: HOME | End: 2021-12-21
Payer: COMMERCIAL

## 2021-12-21 DIAGNOSIS — S82.121D CLOSED FRACTURE OF LATERAL PORTION OF RIGHT TIBIAL PLATEAU WITH ROUTINE HEALING: ICD-10-CM

## 2021-12-21 PROCEDURE — 73564 X-RAY EXAM KNEE 4 OR MORE: CPT

## 2022-01-10 ENCOUNTER — HOSPITAL ENCOUNTER (OUTPATIENT)
Dept: WOMENS IMAGING | Age: 62
Discharge: HOME | End: 2022-01-10
Payer: COMMERCIAL

## 2022-01-10 DIAGNOSIS — S82.121D CLOSED FRACTURE OF LATERAL PORTION OF RIGHT TIBIAL PLATEAU WITH ROUTINE HEALING, SUBSEQUENT ENCOUNTER: ICD-10-CM

## 2022-01-10 PROCEDURE — 77080 DXA BONE DENSITY AXIAL: CPT

## 2022-11-07 ENCOUNTER — HOSPITAL ENCOUNTER (OUTPATIENT)
Dept: MAMMOGRAPHY | Age: 62
Discharge: HOME | End: 2022-11-07
Payer: COMMERCIAL

## 2022-11-07 DIAGNOSIS — Z12.39 SCREENING BREAST EXAMINATION: ICD-10-CM

## 2022-11-07 PROCEDURE — 77063 BREAST TOMOSYNTHESIS BI: CPT

## 2024-03-04 ENCOUNTER — HOSPITAL ENCOUNTER (OUTPATIENT)
Dept: MAMMOGRAPHY | Age: 64
Discharge: HOME | End: 2024-03-04
Payer: COMMERCIAL

## 2024-03-04 VITALS — BODY MASS INDEX: 36.7 KG/M2 | HEIGHT: 64 IN | WEIGHT: 215 LBS

## 2024-03-04 DIAGNOSIS — Z12.39 BREAST SCREENING: ICD-10-CM

## 2024-03-04 PROCEDURE — 77063 BREAST TOMOSYNTHESIS BI: CPT

## 2025-03-25 ENCOUNTER — HOSPITAL ENCOUNTER (OUTPATIENT)
Dept: MAMMOGRAPHY | Age: 65
Discharge: HOME | End: 2025-03-25
Payer: MEDICARE

## 2025-03-25 VITALS — WEIGHT: 215 LBS | BODY MASS INDEX: 38.09 KG/M2 | HEIGHT: 63 IN

## 2025-03-25 DIAGNOSIS — Z12.39 BREAST SCREENING: ICD-10-CM

## 2025-03-25 PROCEDURE — 77063 BREAST TOMOSYNTHESIS BI: CPT

## (undated) DEVICE — BIOGUARD A/W CLEANING ADAPTER

## (undated) DEVICE — GLOVE ORTHO 8   MSG9480